# Patient Record
Sex: FEMALE | Race: BLACK OR AFRICAN AMERICAN | Employment: FULL TIME | URBAN - METROPOLITAN AREA
[De-identification: names, ages, dates, MRNs, and addresses within clinical notes are randomized per-mention and may not be internally consistent; named-entity substitution may affect disease eponyms.]

---

## 2020-05-18 PROBLEM — E03.8 OTHER SPECIFIED HYPOTHYROIDISM: Status: ACTIVE | Noted: 2020-05-18

## 2020-11-13 ENCOUNTER — HOSPITAL ENCOUNTER (EMERGENCY)
Age: 25
Discharge: HOME | End: 2020-11-13
Attending: EMERGENCY MEDICINE
Payer: COMMERCIAL

## 2020-11-13 VITALS
SYSTOLIC BLOOD PRESSURE: 107 MMHG | OXYGEN SATURATION: 100 % | WEIGHT: 158 LBS | HEART RATE: 54 BPM | DIASTOLIC BLOOD PRESSURE: 70 MMHG | TEMPERATURE: 98.4 F | BODY MASS INDEX: 24.8 KG/M2 | RESPIRATION RATE: 16 BRPM | HEIGHT: 67 IN

## 2020-11-13 DIAGNOSIS — Z32.01 PREGNANCY TEST PERFORMED, PREGNANCY CONFIRMED: Primary | ICD-10-CM

## 2020-11-13 LAB — HCG UR QL: POSITIVE

## 2020-11-13 PROCEDURE — 99282 EMERGENCY DEPT VISIT SF MDM: CPT

## 2020-11-13 PROCEDURE — 81025 URINE PREGNANCY TEST: CPT

## 2020-11-13 NOTE — ED PROVIDER NOTES
Patient is a 69-year-old female G1, P0 who recently found out that she was pregnant and comes to the emergency department tonight worried about a UTI. She was late for her last menstrual cycle took home pregnancy tests which were positive also went to birth rate and had positive test.  Scheduled to see OB in 2 weeks. Denies any vaginal bleeding or fluid leakage. Denies abdominal pain. Has had urinary frequency and urgency for 1 week. States it is getting a bit worse. The history is provided by the patient. Bladder Infection    This is a new problem. The current episode started more than 2 days ago. The problem has been gradually worsening. The patient is experiencing no pain. There has been no fever. She is sexually active. There is no history of pyelonephritis. Associated symptoms include frequency, urgency and flank pain. Pertinent negatives include no chills, no nausea, no discharge and no possible pregnancy. She has tried nothing for the symptoms. Her past medical history does not include kidney stones, single kidney, urological procedure or recurrent UTIs. Past Medical History:   Diagnosis Date    Abnormal Papanicolaou smear of cervix     age 23. Had normal yearly paps afterward, up to and including 9-2019    History of chicken pox     Ovarian cyst        Past Surgical History:   Procedure Laterality Date    HX TONSILLECTOMY           No family history on file.     Social History     Socioeconomic History    Marital status: SINGLE     Spouse name: Not on file    Number of children: Not on file    Years of education: Not on file    Highest education level: Not on file   Occupational History    Not on file   Social Needs    Financial resource strain: Not on file    Food insecurity     Worry: Not on file     Inability: Not on file    Transportation needs     Medical: Not on file     Non-medical: Not on file   Tobacco Use    Smoking status: Never Smoker    Smokeless tobacco: Never Used Substance and Sexual Activity    Alcohol use: Yes    Drug use: Yes     Types: Marijuana    Sexual activity: Yes     Partners: Male     Birth control/protection: Implant     Comment: placed 9/5/2019   Lifestyle    Physical activity     Days per week: Not on file     Minutes per session: Not on file    Stress: Not on file   Relationships    Social connections     Talks on phone: Not on file     Gets together: Not on file     Attends [employer] service: Not on file     Active member of club or organization: Not on file     Attends meetings of clubs or organizations: Not on file     Relationship status: Not on file    Intimate partner violence     Fear of current or ex partner: Not on file     Emotionally abused: Not on file     Physically abused: Not on file     Forced sexual activity: Not on file   Other Topics Concern    Not on file   Social History Narrative    Not on file         ALLERGIES: Patient has no known allergies. Review of Systems   Constitutional: Negative for chills, fatigue and fever. HENT: Negative for congestion, rhinorrhea and sore throat. Eyes: Negative for pain, discharge and visual disturbance. Respiratory: Negative for cough and shortness of breath. Cardiovascular: Negative for chest pain and palpitations. Gastrointestinal: Negative for abdominal pain, diarrhea and nausea. Endocrine: Negative for polydipsia and polyuria. Genitourinary: Positive for flank pain, frequency and urgency. Negative for dysuria. Musculoskeletal: Positive for back pain. Negative for neck pain. Skin: Negative for rash. Neurological: Negative for seizures, syncope and weakness. Hematological: Negative. Vitals:    11/13/20 0144   BP: 106/60   Pulse: 62   Resp: 16   Temp: 98.4 °F (36.9 °C)   SpO2: 100%   Weight: 71.7 kg (158 lb)   Height: 5' 7\" (1.702 m)            Physical Exam  Vitals signs and nursing note reviewed. Constitutional:       Appearance: She is well-developed. HENT:      Head: Normocephalic and atraumatic. Eyes:      Extraocular Movements: Extraocular movements intact. Conjunctiva/sclera: Conjunctivae normal.      Pupils: Pupils are equal, round, and reactive to light. Neck:      Musculoskeletal: Normal range of motion and neck supple. Cardiovascular:      Rate and Rhythm: Normal rate and regular rhythm. Pulmonary:      Effort: Pulmonary effort is normal.      Breath sounds: Normal breath sounds. Abdominal:      General: There is no distension. Palpations: Abdomen is soft. There is no mass. Tenderness: There is no abdominal tenderness. There is no guarding or rebound. Musculoskeletal: Normal range of motion. General: No deformity. Lymphadenopathy:      Cervical: No cervical adenopathy. Skin:     General: Skin is warm and dry. Findings: No rash. Neurological:      Mental Status: She is alert and oriented to person, place, and time. GCS: GCS eye subscore is 4. GCS verbal subscore is 5. GCS motor subscore is 6. Cranial Nerves: No cranial nerve deficit. Sensory: No sensory deficit. MDM  Number of Diagnoses or Management Options  Diagnosis management comments: I wore appropriate PPE throughout this patient's ED visit. Francis Jiang MD, 1:57 AM    2:11 AM  Urine hCG positive. Urinalysis is negative. Abdominal exam completely benign. No bleeding or spotting. Clinically no concern for ectopic pregnancy. Patient is relieved. She had questions about what medication she can use informed her that she could take Tylenol. She already has an appointment scheduled with OB. Voice dictation software was used during the making of this note. This software is not perfect and grammatical and other typographical errors may be present. This note has been proofread, but may still contain errors.   Francis Jiang MD; 11/13/2020 @2:14 AM ===================================================================             Amount and/or Complexity of Data Reviewed  Clinical lab tests: ordered and reviewed    Risk of Complications, Morbidity, and/or Mortality  Presenting problems: low  Diagnostic procedures: low  Management options: low    Patient Progress  Patient progress: stable         Procedures

## 2020-11-13 NOTE — ED TRIAGE NOTES
Pt ambulatory to Room #15 for triage in no apparent distress. Pt is wearing a mask. Pt c/o diffuse lower back pain since this morning. Pt reports she is a . Denies heavy lifting. Pt reports LMP 09/27/20 and lasted for 6 days. Pt reports she just recently found out she is pregnant with her first pregnancy. Has OB-GYN appointment on 11/23. Pt reports urinary frequency. Pt denies any burning with urination. Pt states, \"I know you're supposed to pee a lot when you're pregnant, but I peed like 6 times in one hour the other day. \" Pt also reports some spotting. Pt denies fever, body aches, chills. Pt denies any known COVID contacts.

## 2020-11-13 NOTE — DISCHARGE INSTRUCTIONS
Use Tylenol as needed for pain. Follow-up with your OB as scheduled. Return to the emergency department for any bleeding or other acute concerns.

## 2020-11-13 NOTE — ED NOTES
I have reviewed discharge instructions with the patient. The patient verbalized understanding. Patient left ED via Discharge Method: ambulatory to Home with self. Opportunity for questions and clarification provided. Patient given 0 scripts. To continue your aftercare when you leave the hospital, you may receive an automated call from our care team to check in on how you are doing.  This is a free service and part of our promise to provide the best care and service to meet your aftercare needs. \" If you have questions, or wish to unsubscribe from this service please call [phone].  Thank you for Choosing our [facility] Emergency Department.